# Patient Record
Sex: FEMALE | URBAN - METROPOLITAN AREA
[De-identification: names, ages, dates, MRNs, and addresses within clinical notes are randomized per-mention and may not be internally consistent; named-entity substitution may affect disease eponyms.]

---

## 2019-04-08 ENCOUNTER — APPOINTMENT (RX ONLY)
Dept: URBAN - METROPOLITAN AREA CLINIC 122 | Facility: CLINIC | Age: 24
Setting detail: DERMATOLOGY
End: 2019-04-08

## 2019-04-08 DIAGNOSIS — L30.5 PITYRIASIS ALBA: ICD-10-CM

## 2019-04-08 DIAGNOSIS — L85.3 XEROSIS CUTIS: ICD-10-CM

## 2019-04-08 DIAGNOSIS — L65.0 TELOGEN EFFLUVIUM: ICD-10-CM

## 2019-04-08 PROCEDURE — ? COUNSELING

## 2019-04-08 PROCEDURE — ? TREATMENT REGIMEN

## 2019-04-08 PROCEDURE — 99203 OFFICE O/P NEW LOW 30 MIN: CPT

## 2019-04-08 PROCEDURE — ? PRESCRIPTION

## 2019-04-08 RX ORDER — CLOBETASOL PROPIONATE 0.46 MG/ML
FEW SOLUTION TOPICAL HS
Qty: 50 | Refills: 5 | Status: ERX | COMMUNITY
Start: 2019-04-08

## 2019-04-08 RX ADMIN — CLOBETASOL PROPIONATE FEW: 0.46 SOLUTION TOPICAL at 20:25

## 2019-04-08 NOTE — PROCEDURE: TREATMENT REGIMEN
Detail Level: Zone
Plan: ADVISED IF CONCERNED ABOUT BLOODWORK OVER THE YEARS SEE PCP/INTERNIST TO DISCUSS. THYROID LEVELS NORMAL
Initiate Treatment: may use clobetasol sol daily to back
Initiate Treatment: cerave to moisturize

## 2019-04-08 NOTE — HPI: HAIR LOSS
Previous Labs: Yes
How Did The Hair Loss Occur?: gradual in onset
How Severe Is Your Hair Loss?: moderate
What Hair Products Do You Use?: Diva girl shampoo and conditioner
When Were The Labs Drawn? (Drawn...): 12/2018